# Patient Record
Sex: MALE | URBAN - METROPOLITAN AREA
[De-identification: names, ages, dates, MRNs, and addresses within clinical notes are randomized per-mention and may not be internally consistent; named-entity substitution may affect disease eponyms.]

---

## 2017-02-04 ENCOUNTER — AMBULATORY - HEALTHEAST (OUTPATIENT)
Dept: NEUROSURGERY | Facility: CLINIC | Age: 27
End: 2017-02-04

## 2017-03-08 ENCOUNTER — COMMUNICATION - HEALTHEAST (OUTPATIENT)
Dept: SCHEDULING | Facility: CLINIC | Age: 27
End: 2017-03-08

## 2017-04-03 ENCOUNTER — AMBULATORY - HEALTHEAST (OUTPATIENT)
Dept: NEUROSURGERY | Facility: CLINIC | Age: 27
End: 2017-04-03

## 2017-04-07 ENCOUNTER — OFFICE VISIT - HEALTHEAST (OUTPATIENT)
Dept: NEUROSURGERY | Facility: CLINIC | Age: 27
End: 2017-04-07

## 2017-04-07 DIAGNOSIS — M54.50 LOW BACK PAIN ASSOCIATED WITH A SPINAL DISORDER OTHER THAN RADICULOPATHY OR SPINAL STENOSIS: ICD-10-CM

## 2017-04-07 RX ORDER — TOPIRAMATE SPINKLE 25 MG/1
25 CAPSULE ORAL DAILY
Status: SHIPPED | COMMUNITY
Start: 2017-04-07

## 2017-04-07 RX ORDER — HYDROCODONE BITARTRATE AND ACETAMINOPHEN 5; 325 MG/1; MG/1
1 TABLET ORAL EVERY 6 HOURS PRN
Status: SHIPPED | COMMUNITY
Start: 2017-04-07

## 2017-04-07 RX ORDER — BACLOFEN 20 MG/1
20 TABLET ORAL AT BEDTIME
Status: SHIPPED | COMMUNITY
Start: 2017-04-07

## 2017-04-07 ASSESSMENT — MIFFLIN-ST. JEOR: SCORE: 1936.02

## 2021-05-30 VITALS — WEIGHT: 230 LBS | BODY MASS INDEX: 36.96 KG/M2 | HEIGHT: 66 IN

## 2021-06-09 NOTE — PROGRESS NOTES
Patient is here to follow up for back pain.    He states that anytime he lifts more than 50 pounds he gets a pressure pain in the lower part of his back  Radicular Pain:  denies  Motor complaints: slight bilateral leg  weakness in the morning  Sensory complaints: denies     Gait and balance issues: denies  Bowel or bladder issues: denies       Patient has tried the following conservative measures: injections- helped alittle          DAISHA today is: 27%       Jj, CMA

## 2021-06-09 NOTE — PROGRESS NOTES
"Diagnoses and all orders for this visit:    Low back pain associated with a spinal disorder other than radiculopathy or spinal stenosis          It was a pleasure to see Salvatore Vizcarra today in followup for referral from Dr. Kimbrough for low back pain and L5-S1 disc herniation. I reviewed Dr. Kimbrough's note from 4/3/17, indicating Salvatore's treatment with [employer] and his inability to tolerate strengthening any further.  Salvatore notes that his name is Salvatore Eddy, and he prefers to be called Mr. Fleming.      He has a new right L4-5 disc bulge on MRI, and an existing left disc bulge at L5-S1 on MRI. If he had leg pain/radicular numbness or weakness, I would do a microdiskectomy at this point. He does not have radicular pain, numbness, or weakness at this point, therefore I do not think microdiskectomy is indicated. There is no other surgery indicated, certainly fusion surgery is not indicated at this time- He has normal facet joints and does not have advanced disc degeneration. Therefore I don't recommend any surgical intervention because I don't think his pattern of low back pain needs surgery to help it- I think he needs to STOP lifting anything significant and LOSE WEIGHT to help his back pain.  If he had surgery, he would have a weight restriction for the rest of his life; I think he needs that without surgery.  I explained that if he had any weakness, numbness, leg pain, he can return to see me at any time.            I spent 25 total minutes in patient care; 15 minutes of that was spent in counseling and/or coordination of care.      I performed independent visualization of radiographic imaging and entered my own interpretation, reviewed and/or ordered tests in radiology, made the decision to obtain old records and/or history from someone other than the patient and Reviewed and summarized old records and/or discussed this case with another health care provider\".        Subjective:    Patient ID: Salvatore THORNTON " Zackery is a 25 y.o. male.  HPI      Salvatore returns with worsening back pain.  Salvatore has low back pain radiating into his left buttock, not down his leg at all. No right leg pain. No weakness or numbness in leg. No bowel or bladder symptoms.  Back pain is 70% of his complaint, left buttock is 30%, and both are pain level 1 or 2 unless he is lifting something heavier, which exacerbates his pain to above 4.  His pain pattern has improved after PT and ERINN, only to worsen again whenever he lifts something heavy.       Salvatore notes that his name is Salvatore Eddy, and he prefers to be called Mr. Fleming.  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 Eddy to fall with immediate pain. Pain is currently the same today as it was then. He was seen at [facility] where xrays of his spine were taken and are reportedly negative; Salvatore also had an episode of pain at work on 12/4/15 while lifting a refrigerator, and his back was hurting after than episode when he had the fall on 12/15/15, which caused significant worsening in that pain.       First consultation with me was 2/22/16. I ordered left S[facility]-S2 [facility]; It relieved 95% of his low back pain; his DAISHA went down to 6%. I then recommended a month longer of PT and lifting restrictions. He went back to work at [employer] delivering appliances. He then lifted a 500 pound refrigerator on the second floor delivery on September 16, 2016. His entire left leg felt numb. He took the rest of the day off of work and went on light duty until September 22. His numbness resolved. Then, on September 22, he was carrying a heavy couch and his left leg fell asleep as he was carrying it. His whole leg was again numb. The left leg has since returned  to normal sensation. He then had severe left buttock pain, which was his primary complaint at his initial injury.  I saw him on 10/6/16 and ordered another left S1-S2 TFESI which was done on 10/24/16 and provided significant relief. He went back to lifting above 50 pounds in PT.  He then had worsening of his symptoms, got new MRI at [facility] on 2/4/17 that showed new right L4-L5 disc bulge with right lateral recess stenosis, and existing left L5-S1 disc bulge with left lateral recess stenosis.  He had an intralaminar L5-S1 ERINN on 2/20/17 that provided moderate relief.          Body mass index is 37.12 kg/(m^2).; increased from 35 at last visit. I had previously counselled him to lose weight; he has gained weight.    Review of Systems   Constitutional: Negative for activity change, appetite change, chills, fatigue, fever and unexpected weight change.   HENT: Negative for dental problem, mouth sores and trouble swallowing.   Eyes: Negative for visual disturbance.   Respiratory: Negative for shortness of breath.   Cardiovascular: Negative for leg swelling.   Gastrointestinal: Negative for abdominal pain, constipation, diarrhea, nausea and vomiting.   Endocrine: Negative for cold intolerance.   Genitourinary: Negative for difficulty urinating, dysuria, enuresis, frequency and urgency.   Musculoskeletal: Positive for back pain. Negative for gait problem, neck pain and neck stiffness.   Skin: Negative for color change.   Allergic/Immunologic: Negative for immunocompromised state.   Neurological: Negative for tremors, speech difficulty, weakness, numbness and headaches.   Hematological: Does not bruise/bleed easily.   Psychiatric/Behavioral: The patient is not nervous/anxious.     IMAGING:  MRI at [facility] on 2/4/17 that showed new right L4-L5 disc bulge with right lateral recess stenosis, and existing left L5-S1 disc bulge with left lateral recess stenosis        NO change to medical/surgical/social/family history  Past Medical  History- Positive for obesity with BMI of 37. He gained 20 pounds since the accident because his activity level is limited by pain. Negative for any arthritis, hypertension, diabetes, or cardiac disease. No significant past medical history.  Past Surgical History- no prior surgeries.  Social History-  with wife and children. Former smoker.  Family History- no history of congenital spinal disorders.            Objective:    Physical Exam   Constitutional: He is oriented to person, place, and time. He appears well-developed and well-nourished. He is cooperative. No distress.   Strength:    LEFT RIGHT  Hip flexion  5 5  Knee Extension 5 5  Dorsiflexion   5  5  Extensor Shaan Longus 5  5  Plantar Flexion  5  5      Sensation intact to light touch, patellar/Achilles reflexes 1+ bilaterally  Able to do single-leg standing calf raises x10 on left leg and right leg normally, without support.

## 2021-06-15 PROBLEM — M54.50 LOW BACK PAIN ASSOCIATED WITH A SPINAL DISORDER OTHER THAN RADICULOPATHY OR SPINAL STENOSIS: Status: ACTIVE | Noted: 2017-04-07
